# Patient Record
Sex: MALE | Employment: FULL TIME | URBAN - METROPOLITAN AREA
[De-identification: names, ages, dates, MRNs, and addresses within clinical notes are randomized per-mention and may not be internally consistent; named-entity substitution may affect disease eponyms.]

---

## 2023-04-26 ENCOUNTER — MEDICAL CORRESPONDENCE (OUTPATIENT)
Dept: HEALTH INFORMATION MANAGEMENT | Facility: CLINIC | Age: 58
End: 2023-04-26
Payer: COMMERCIAL

## 2023-04-27 ENCOUNTER — TRANSCRIBE ORDERS (OUTPATIENT)
Dept: OTHER | Age: 58
End: 2023-04-27

## 2023-04-27 DIAGNOSIS — R05.3 PERSISTENT COUGH: Primary | ICD-10-CM

## 2023-04-28 DIAGNOSIS — R05.3 PERSISTENT COUGH: Primary | ICD-10-CM

## 2023-05-02 ENCOUNTER — TELEPHONE (OUTPATIENT)
Dept: PULMONOLOGY | Facility: CLINIC | Age: 58
End: 2023-05-02
Payer: COMMERCIAL

## 2023-05-02 NOTE — TELEPHONE ENCOUNTER
Left Voicemail (1st Attempt) for the patient to call back and schedule the following:    Appointment type: CXR  Provider: FERNANDO  Return date: 7/10/23  Specialty phone number: [phone]  Additional appointment(s) needed: NA  Additonal Notes: NA

## 2023-05-09 ENCOUNTER — TELEPHONE (OUTPATIENT)
Dept: PULMONOLOGY | Facility: CLINIC | Age: 58
End: 2023-05-09
Payer: COMMERCIAL

## 2023-05-09 NOTE — TELEPHONE ENCOUNTER
Left Voicemail (1st Attempt) for the patient to call back and schedule the following:    Appointment type: CXR  Provider: Goldie  Return date: 7/10/23  Specialty phone number: [phone]  Additional appointment(s) needed: na  Additonal Notes: 5/9/23 - LVM and call ctr number.

## 2023-06-27 NOTE — CONFIDENTIAL NOTE
RECORDS RECEIVED FROM: internal /external     DATE RECEIVED: 7/10/23     NOTES STATUS DETAILS   OFFICE NOTE from referring provider External  Dr. Justino Whiting at [facility]   OFFICE NOTE from other specialist       DISCHARGE SUMMARY from hospital       DISCHARGE REPORT from the ER       MEDICATION LIST       IMAGING  (NEED IMAGES AND REPORTS)       CT SCAN       CHEST XRAY (CXR) In process      TESTS       PULMONARY FUNCTION TESTING (PFT) internal  Scheduled 7/10/23     Erwin- PFT -4.19.23       Action 6/10/23 sv    Action Taken Message sent to CC pool to help schedule CXR order      Records request sent to - Dr. Justino Whiting at [facility]  Fax--[phone]          Action 6.29.23 sv   Action Taken Received records from [facility]- sent to uploading   Erwin- PFT -4.19.23      Sent request back for images   -CXR- 4.19.23

## 2023-07-10 ENCOUNTER — ANCILLARY PROCEDURE (OUTPATIENT)
Dept: GENERAL RADIOLOGY | Facility: CLINIC | Age: 58
End: 2023-07-10
Attending: INTERNAL MEDICINE
Payer: COMMERCIAL

## 2023-07-10 ENCOUNTER — PRE VISIT (OUTPATIENT)
Dept: PULMONOLOGY | Facility: CLINIC | Age: 58
End: 2023-07-10

## 2023-07-10 ENCOUNTER — OFFICE VISIT (OUTPATIENT)
Dept: PULMONOLOGY | Facility: CLINIC | Age: 58
End: 2023-07-10
Attending: INTERNAL MEDICINE
Payer: COMMERCIAL

## 2023-07-10 VITALS
OXYGEN SATURATION: 100 % | HEIGHT: 69 IN | DIASTOLIC BLOOD PRESSURE: 82 MMHG | HEART RATE: 78 BPM | RESPIRATION RATE: 17 BRPM | BODY MASS INDEX: 31.1 KG/M2 | SYSTOLIC BLOOD PRESSURE: 123 MMHG | WEIGHT: 210 LBS

## 2023-07-10 DIAGNOSIS — R05.3 PERSISTENT COUGH: ICD-10-CM

## 2023-07-10 DIAGNOSIS — R05.3 PERSISTENT COUGH: Primary | ICD-10-CM

## 2023-07-10 PROCEDURE — G0463 HOSPITAL OUTPT CLINIC VISIT: HCPCS | Performed by: INTERNAL MEDICINE

## 2023-07-10 PROCEDURE — 99204 OFFICE O/P NEW MOD 45 MIN: CPT | Mod: 25 | Performed by: INTERNAL MEDICINE

## 2023-07-10 PROCEDURE — 94726 PLETHYSMOGRAPHY LUNG VOLUMES: CPT | Performed by: INTERNAL MEDICINE

## 2023-07-10 PROCEDURE — 94375 RESPIRATORY FLOW VOLUME LOOP: CPT | Performed by: INTERNAL MEDICINE

## 2023-07-10 PROCEDURE — 71046 X-RAY EXAM CHEST 2 VIEWS: CPT | Mod: GC | Performed by: RADIOLOGY

## 2023-07-10 PROCEDURE — 94729 DIFFUSING CAPACITY: CPT | Performed by: INTERNAL MEDICINE

## 2023-07-10 RX ORDER — LOSARTAN POTASSIUM AND HYDROCHLOROTHIAZIDE 12.5; 1 MG/1; MG/1
1 TABLET ORAL
COMMUNITY
Start: 2023-06-11

## 2023-07-10 RX ORDER — FLUTICASONE PROPIONATE AND SALMETEROL 250; 50 UG/1; UG/1
1 POWDER RESPIRATORY (INHALATION) EVERY 12 HOURS
Qty: 1 EACH | Refills: 3 | Status: SHIPPED | OUTPATIENT
Start: 2023-07-10

## 2023-07-10 RX ORDER — BENZONATATE 100 MG/1
100 CAPSULE ORAL 3 TIMES DAILY PRN
Qty: 30 CAPSULE | Refills: 3 | Status: SHIPPED | OUTPATIENT
Start: 2023-07-10

## 2023-07-10 RX ORDER — ASPIRIN 81 MG/1
81 TABLET ORAL DAILY
COMMUNITY

## 2023-07-10 RX ORDER — CETIRIZINE HYDROCHLORIDE 10 MG/1
10 TABLET ORAL DAILY
COMMUNITY

## 2023-07-10 ASSESSMENT — PAIN SCALES - GENERAL: PAINLEVEL: NO PAIN (0)

## 2023-07-10 NOTE — NURSING NOTE
Chief Complaint   Patient presents with     Consult     New persistent cough     Medications reviewed and vital signs taken.   Serafin Dangelo CMA

## 2023-07-10 NOTE — PROGRESS NOTES
Pulmonary Clinic  New Patient Evaluation    Name: Rashel Claros MRN: [id number]     Age: 57 year old   YOB: 1965             HPI:   CC: Chronic cough    Rashel Claros is a 57 year old male who presents to discuss chronic cough.      Dec 2021 Thinks had covid, but this was before testing was readily available.    July 2022 Same Sx returned, positive for covid    Oct 2022 Babysat granddaughter, then got cold that lasted until Dec    Jan 2023 Babysat for a week, then got sick again through april April 2023 went to PCP and got referrral to pulmonary.  Treated with Tessalon which seemed to help a lot with the cough.  Was also given Symbicort.    Also treated with a Z-Charles and prednisone in March.  The cough became dry after this, but persisted.    He is able to be active playing tennis and other activities without dyspnea or cough, the cough is particularly troublesome at night.  He has no wheezing.    He is on losartan which she has been on for over 20 years.    Cough, mucous, some blood streaks, then lingered as a dry cough in the evenings but progressively improved until now    The only new exposure he can identify is that his granddaughter is now in  and frequently has colds.      Exposure History:   Tobacco: Never  Other inhaled substances (Vaping, hookah. 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  About a year ago had trouble with GERD and was maciej lots of tumms without much help.   Started on zypan, portion control and it's gotten better.         Past Medical History:     Past Medical History:   Diagnosis Date    Disorders of iron metabolism              Past Surgical History:      Past Surgical History:   Procedure Laterality Date    NO HISTORY OF SURGERY               Social History:     Social History     Socioeconomic History    Marital status:      Spouse name: Not on file     "Number of children: Not on file    Years of education: Not on file    Highest education level: Not on file   Occupational History    Not on file   Tobacco Use    Smoking status: Never    Smokeless tobacco: Not on file   Substance and Sexual Activity    Alcohol use: Yes     Comment: 1x monthly    Drug use: No    Sexual activity: Not on file   Other Topics Concern    Not on file   Social History Narrative    Not on file     Social Determinants of Health     Financial Resource Strain: Not on file   Food Insecurity: Not on file   Transportation Needs: Not on file   Physical Activity: Not on file   Stress: Not on file   Social Connections: Not on file   Intimate Partner Violence: Not on file   Housing Stability: Not on file            Family History:     Family History   Problem Relation Age of Onset    Heart Disease Father         at age 50    Cancer Maternal Grandmother         stomach at age 96    Heart Disease Paternal Grandfather     Blood Disease Sister              Immunizations:     Immunization History   Administered Date(s) Administered    TDAP (Adacel,Boostrix) 09/18/2006             Allergies:     Allergies   Allergen Reactions    Bees     Peanuts [Nuts]      Cold sores             Medications:   ANTIOXIDANT FORMULA OR CAPS, DAILY  CALCIUM 500 MG OR TABS, 1 TAB DAILY  EPIPEN 1:1000 IM ADITHYA, 1 TIME ONLY  GLUCOSAMINE CHONDR 500 COMPLEX OR CAPS, 1-2 tabs daily  MULTIVITAMIN TABS   OR, 1 TAB DAILY  PREDNISONE 20 MG OR TABS, 1 TABLET BY MOUTH TWICE DAILY FOR 5 DAYS  ZITHROMAX 250 MG OR TABS, 2 TABLETS TODAY, THEN 1 TABLET DAILY x next 4 days    No current facility-administered medications on file prior to visit.           Review of Systems:     Review of Systems   Respiratory:  Positive for cough. Negative for sputum production, shortness of breath and wheezing.               Exam:   /82   Pulse 78   Resp 17   Ht 1.753 m (5' 9\")   Wt 95.3 kg (210 lb)   SpO2 100%   BMI 31.01 kg/m       Physical " Exam  Vitals and nursing note reviewed.   Constitutional:       Appearance: Normal appearance.   Cardiovascular:      Rate and Rhythm: Normal rate and regular rhythm.      Heart sounds: No murmur heard.  Pulmonary:      Effort: Pulmonary effort is normal.      Breath sounds: Normal breath sounds. No wheezing, rhonchi or rales.   Neurological:      Mental Status: He is alert.       Fingernails without clubbing         Data:     PFT 7/10/2023    The FVC is within normal limits, the FEV1 is reduced, the FEV1/FVC ratio is within normal limits.  The lung volumes are within normal limits.  The diffusion capacity is within normal limits.    IMPRESSION:  Possible mild obstruction.    Chest x-ray 7/10/2023  Negative chest.    All studies listed here were independently reviewed and interpreted by me today.          Assessment and Plan:     ## Chronic cough  This seems to have started with COVID and was productive initially, then became dry.  It improved significantly for some time, then returned.  He does have exposure to a granddaughter who is in  and has frequent colds.  He does have some GERD, but this is well controlled with medications.  His cough improved when he was given Tessalon and Symbicort, and his PFTs revealed possible mild obstruction so cough variant asthma or reactive airways disease are considerations.  -Refill Tessalon  -Start Marybel Amezcua M.D.  Pulmonary & Critical Care  Pager: Click Here to page      The above note was dictated using voice recognition software and may include typographical errors. Please contact the author for any clarifications.

## 2023-07-10 NOTE — LETTER
7/10/2023         RE: Rashel Claros  [address] [address] [address]        Dear Colleague,    Thank you for referring your patient, Rashel Claros, to the [facility] [place]. Please see a copy of my visit note below.              Pulmonary Clinic  New Patient Evaluation    Name: Rashel Claros MRN: [id number]     Age: 57 year old   YOB: 1965             HPI:   CC: Chronic cough    Rashel Claros is a 57 year old male who presents to discuss chronic cough.      Dec 2021 Thinks had covid, but this was before testing was readily available.    July 2022 Same Sx returned, positive for covid    Oct 2022 Babysat granddaughter, then got cold that lasted until Dec    Jan 2023 Babysat for a week, then got sick again through april April 2023 went to PCP and got referrral to pulmonary.  Treated with Tessalon which seemed to help a lot with the cough.  Was also given Symbicort.    Also treated with a Z-Charles and prednisone in March.  The cough became dry after this, but persisted.    He is able to be active playing tennis and other activities without dyspnea or cough, the cough is particularly troublesome at night.  He has no wheezing.    He is on losartan which she has been on for over 20 years.    Cough, mucous, some blood streaks, then lingered as a dry cough in the evenings but progressively improved until now    The only new exposure he can identify is that his granddaughter is now in  and frequently has colds.      Exposure History:   Tobacco: Never  Other inhaled substances (Vaping, hookah. 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  About a year ago had trouble with GERD and was maciej lots of tumms without much help.   Started on zypan, portion control and it's gotten better.         Past Medical History:     Past Medical History:   Diagnosis  Date    Disorders of iron metabolism              Past Surgical History:      Past Surgical History:   Procedure Laterality Date    NO HISTORY OF SURGERY               Social History:     Social History     Socioeconomic History    Marital status:      Spouse name: Not on file    Number of children: Not on file    Years of education: Not on file    Highest education level: Not on file   Occupational History    Not on file   Tobacco Use    Smoking status: Never    Smokeless tobacco: Not on file   Substance and Sexual Activity    Alcohol use: Yes     Comment: 1x monthly    Drug use: No    Sexual activity: Not on file   Other Topics Concern    Not on file   Social History Narrative    Not on file     Social Determinants of Health     Financial Resource Strain: Not on file   Food Insecurity: Not on file   Transportation Needs: Not on file   Physical Activity: Not on file   Stress: Not on file   Social Connections: Not on file   Intimate Partner Violence: Not on file   Housing Stability: Not on file            Family History:     Family History   Problem Relation Age of Onset    Heart Disease Father         at age 50    Cancer Maternal Grandmother         stomach at age 96    Heart Disease Paternal Grandfather     Blood Disease Sister              Immunizations:     Immunization History   Administered Date(s) Administered    TDAP (Adacel,Boostrix) 09/18/2006             Allergies:     Allergies   Allergen Reactions    Bees     Peanuts [Nuts]      Cold sores             Medications:   ANTIOXIDANT FORMULA OR CAPS, DAILY  CALCIUM 500 MG OR TABS, 1 TAB DAILY  EPIPEN 1:1000 IM ADITHYA, 1 TIME ONLY  GLUCOSAMINE CHONDR 500 COMPLEX OR CAPS, 1-2 tabs daily  MULTIVITAMIN TABS   OR, 1 TAB DAILY  PREDNISONE 20 MG OR TABS, 1 TABLET BY MOUTH TWICE DAILY FOR 5 DAYS  ZITHROMAX 250 MG OR TABS, 2 TABLETS TODAY, THEN 1 TABLET DAILY x next 4 days    No current facility-administered medications on file prior to visit.           Review of  "Systems:     Review of Systems   Respiratory:  Positive for cough. Negative for sputum production, shortness of breath and wheezing.               Exam:   /82   Pulse 78   Resp 17   Ht 1.753 m (5' 9\")   Wt 95.3 kg (210 lb)   SpO2 100%   BMI 31.01 kg/m       Physical Exam  Vitals and nursing note reviewed.   Constitutional:       Appearance: Normal appearance.   Cardiovascular:      Rate and Rhythm: Normal rate and regular rhythm.      Heart sounds: No murmur heard.  Pulmonary:      Effort: Pulmonary effort is normal.      Breath sounds: Normal breath sounds. No wheezing, rhonchi or rales.   Neurological:      Mental Status: He is alert.       Fingernails without clubbing         Data:     PFT 7/10/2023    The FVC is within normal limits, the FEV1 is reduced, the FEV1/FVC ratio is within normal limits.  The lung volumes are within normal limits.  The diffusion capacity is within normal limits.    IMPRESSION:  Possible mild obstruction.    Chest x-ray 7/10/2023  Negative chest.    All studies listed here were independently reviewed and interpreted by me today.          Assessment and Plan:     ## Chronic cough  This seems to have started with COVID and was productive initially, then became dry.  It improved significantly for some time, then returned.  He does have exposure to a granddaughter who is in  and has frequent colds.  He does have some GERD, but this is well controlled with medications.  His cough improved when he was given Tessalon and Symbicort, and his PFTs revealed possible mild obstruction so cough variant asthma or reactive airways disease are considerations.  -Refill Tessalon  -Start Marybel Amezcua M.D.  Pulmonary & Critical Care  Pager: Click Here to page      The above note was dictated using voice recognition software and may include typographical errors. Please contact the author for any clarifications.  "

## 2023-07-11 LAB
DLCOUNC-%PRED-PRE: 117 %
DLCOUNC-PRE: 32.42 ML/MIN/MMHG
DLCOUNC-PRED: 27.49 ML/MIN/MMHG
ERV-%PRED-PRE: 54 %
ERV-PRE: 0.72 L
ERV-PRED: 1.32 L
EXPTIME-PRE: 10.11 SEC
FEF2575-%PRED-PRE: 55 %
FEF2575-PRE: 1.66 L/SEC
FEF2575-PRED: 2.99 L/SEC
FEFMAX-%PRED-PRE: 76 %
FEFMAX-PRE: 7.07 L/SEC
FEFMAX-PRED: 9.27 L/SEC
FEV1-%PRED-PRE: 75 %
FEV1-PRE: 2.53 L
FEV1FEV6-PRE: 76 %
FEV1FEV6-PRED: 80 %
FEV1FVC-PRE: 70 %
FEV1FVC-PRED: 79 %
FEV1SVC-PRE: 67 %
FEV1SVC-PRED: 67 %
FIFMAX-PRE: 6.45 L/SEC
FRCPLETH-%PRED-PRE: 82 %
FRCPLETH-PRE: 2.85 L
FRCPLETH-PRED: 3.43 L
FVC-%PRED-PRE: 84 %
FVC-PRE: 3.61 L
FVC-PRED: 4.26 L
IC-%PRED-PRE: 84 %
IC-PRE: 3.06 L
IC-PRED: 3.64 L
RVPLETH-%PRED-PRE: 106 %
RVPLETH-PRE: 2.12 L
RVPLETH-PRED: 2 L
TLCPLETH-%PRED-PRE: 84 %
TLCPLETH-PRE: 5.91 L
TLCPLETH-PRED: 6.98 L
VA-%PRED-PRE: 90 %
VA-PRE: 5.73 L
VC-%PRED-PRE: 74 %
VC-PRE: 3.79 L
VC-PRED: 5.05 L

## 2023-08-14 ASSESSMENT — ENCOUNTER SYMPTOMS
SHORTNESS OF BREATH: 0
WHEEZING: 0
SPUTUM PRODUCTION: 0
COUGH: 1

## 2023-08-19 ENCOUNTER — HEALTH MAINTENANCE LETTER (OUTPATIENT)
Age: 58
End: 2023-08-19

## 2024-10-12 ENCOUNTER — HEALTH MAINTENANCE LETTER (OUTPATIENT)
Age: 59
End: 2024-10-12